# Patient Record
(demographics unavailable — no encounter records)

---

## 2024-12-10 NOTE — ASSESSMENT
[FreeTextEntry1] : 22 yr old male with Allergic Rhinitis, S/P deviated septum repair 8/24, was referred here by ENT Dr. Drummond for further evaluation of LPRD.  He C/O frequent phlegm upon awakening in the am or at hs since 2019. He sometimes vomits or spits up mucous when he wakes up occurring about 2-3 times a week. He had taken pantoprazole and famotine for 3 to 4 months until August of this year with improvement in symptoms; they were occurring about once a week. He has noted increased symptoms since he has been off of them. He also C/O a globus sensation for the past 2-3 weeks. No weight loss, abd pain, chest pain, constipation, diarrhea. or blood in the stool. He C/O occasional lower abdominal gas pains that go away with a BM (1-2 time a week).  He smokes marijuana daily for the past 2 1/2 years.    LPRD with globus sensation  R/O silent GERD - Will start pantoprazole 40 mg 1/2 hr before breakfast and famotidine 40 mg at hs - Will schedule an EGD; risks and benefits discussed - GERD diet and GERD measures discussed such as no late night eating, no eating or drinking within 3 hrs of hs

## 2024-12-10 NOTE — HISTORY OF PRESENT ILLNESS
[FreeTextEntry1] : 22 yr old male with Allergic Rhinitis, S/P deviated septum repair 8/24, was referred here by ENT Dr. Drummond for further evaluation of LPRD.  He C/O frequent phlegm upon awakening in the am or at hs since 2019. He sometimes vomits or spits up mucous when he wakes up occurring about 2-3 times a week. He had taken pantoprazole and famotine for 3 to 4 months until August of this year with improvement in symptoms; they were occurring about once a week. He has noted increased symptoms since he has been off of them. He also C/O a globus sensation for the past 2-3 weeks. No weight loss, abd pain, chest pain, constipation, diarrhea. or blood in the stool. He C/O occasional lower abdominal gas pains that go away with a BM (1-2 time a week).  He smokes marijuana daily for the past 2 1/2 years.

## 2024-12-10 NOTE — PHYSICAL EXAM
[Alert] : alert [Normal Voice/Communication] : normal voice/communication [No Acute Distress] : no acute distress [Well Developed] : well developed [Well Nourished] : well nourished [Sclera] : the sclera and conjunctiva were normal [Hearing Threshold Finger Rub Not [place]] : hearing was normal [Normal Appearance] : the appearance of the neck was normal [No Respiratory Distress] : no respiratory distress [No Acc Muscle Use] : no accessory muscle use [Respiration, Rhythm And Depth] : normal respiratory rhythm and effort [Auscultation Breath Sounds / Voice Sounds] : lungs were clear to auscultation bilaterally [Heart Rate And Rhythm] : heart rate was normal and rhythm regular [Bowel Sounds] : normal bowel sounds [Abdomen Tenderness] : non-tender [No Masses] : no abdominal mass palpated [Abdomen Soft] : soft [Abnormal Walk] : normal gait [Normal Color / Pigmentation] : normal skin color and pigmentation [Oriented To Time, Place, And Person] : oriented to person, place, and time

## 2024-12-10 NOTE — PHYSICAL EXAM
[Alert] : alert [Normal Voice/Communication] : normal voice/communication [No Acute Distress] : no acute distress [Well Developed] : well developed [Well Nourished] : well nourished [Sclera] : the sclera and conjunctiva were normal [Hearing Threshold Finger Rub Not [place]] : hearing was normal [Normal Appearance] : the appearance of the neck was normal [No Respiratory Distress] : no respiratory distress [No Acc Muscle Use] : no accessory muscle use [Respiration, Rhythm And Depth] : normal respiratory rhythm and effort [Auscultation Breath Sounds / Voice Sounds] : lungs were clear to auscultation bilaterally [Heart Rate And Rhythm] : heart rate was normal and rhythm regular [Bowel Sounds] : normal bowel sounds [Abdomen Tenderness] : non-tender [No Masses] : no abdominal mass palpated [Abdomen Soft] : soft [Abnormal Walk] : normal gait [Normal Color / Pigmentation] : normal skin color and pigmentation [Oriented To Time, Place, And Person] : oriented to person, place, and time

## 2024-12-12 NOTE — HISTORY OF PRESENT ILLNESS
[FreeTextEntry1] : Patient returns today c/o deviated septum, nasal obstruction and LPRD. He states  he is doing almost completely better. He was sick a month ago and had a virus. After this he did still feel some congestion and facial pressure. Since then he has started to get better and is no longer feeling as congested.  He does feel a ball in his throat. He is seeing GI and is getting an endoscopy. No further complaints.

## 2024-12-12 NOTE — REASON FOR VISIT
[Subsequent Evaluation] : a subsequent evaluation for [FreeTextEntry2] : deviated septum, nasal obstruction and LPRD.

## 2024-12-12 NOTE — PROCEDURE
[Flexible Endoscope] : examined with the flexible endoscope [Congested] : congested [Hank] : hank [FreeTextEntry6] : The following anatomic sites were directly examined in a sequential fashion: The scope was introduced in the nasal passage between the middle and inferior turbinates to exam the inferior portion of the middle meatus and the fontanelle, as well as the maxillary ostia. Next, the scope was passed medically and posteriorly to the middle turbinates to examine the sphenoethmoid recess and the superior turbinate region. turbinate hypertrophy

## 2024-12-12 NOTE — PHYSICAL EXAM
[Nasal Endoscopy Performed] : nasal endoscopy was performed, see procedure section for findings [de-identified] : edema [Normal] : mucosa is normal [Midline] : trachea located in midline position

## 2025-03-13 NOTE — HISTORY OF PRESENT ILLNESS
[FreeTextEntry1] : Patient returns following up c/o Hypertrophy of both inferior nasal turbinate's', Nasal obstruction, LPRD. Reports he's doing well. States breathing has improved, says he only gets congested when allergies flare up.  States he went to GI. Pt did a colonoscopy. GI doctor told pt he has mucus build up in his esophagus.  States improvement on globus sensation.  States mucus comes out when he needs too.  No further complaints or concerns.

## 2025-03-13 NOTE — PROCEDURE
[Globus] : globus [None] : none [Flexible Endoscope] : examined with the flexible endoscope [True Vocal Cords Erythematous] : bilateral true vocal cord edema [Glottis Arytenoid Cartilages Erythema] : bilateral arytenoid ~M erythema [Normal] : posterior cricoid area had healthy pink mucosa in the interarytenoid area and the esophageal inlet

## 2025-03-13 NOTE — REASON FOR VISIT
[Subsequent Evaluation] : a subsequent evaluation for [FreeTextEntry2] : Hypertrophy of both inferior nasal turbinates, Nasal obstruction, LPRD.

## 2025-04-23 NOTE — SOCIAL HISTORY
[House] : [unfilled] lives in a house  [Central Forced Air] : heating provided by central forced air [Central] : air conditioning provided by central unit [Humidifier] : uses a humidifier [Dehumidifier] : uses a dehumidifier [Cat] : cat [Smokers in Household] : there are smokers in the home [Dust Mite Covers] : does not have dust mite covers [Feather Pillows] : does not have feather pillows [Feather Comforter] : does not have a feather comforter [Bedroom] : not in the bedroom [Basement] : not in the basement [Living Area] : not in the living area

## 2025-04-23 NOTE — ASSESSMENT
[FreeTextEntry1] : 1. AR vs BRISSA - Fexofenadine 180mg, Fluticasone nasal - immunocap to environmental allergens

## 2025-04-23 NOTE — HISTORY OF PRESENT ILLNESS
[de-identified] : SON CAVAZOS is a 23 year yo male who is here today for congestion worse in the morning but has it all day he states he chokes on the post nasal drip he has deviated septum corrected but still is experiencing these symptoms he states he does have an allergy to cat and pollen but he want to see what else he might allergic to and how to help better his symptoms he does states he follows up with ENT and PCP but symptoms does not seen to be getting he does have dryness to skin